# Patient Record
Sex: MALE | Race: WHITE
[De-identification: names, ages, dates, MRNs, and addresses within clinical notes are randomized per-mention and may not be internally consistent; named-entity substitution may affect disease eponyms.]

---

## 2018-05-30 ENCOUNTER — HOSPITAL ENCOUNTER (EMERGENCY)
Dept: HOSPITAL 76 - ED | Age: 38
Discharge: HOME | End: 2018-05-30
Payer: OTHER GOVERNMENT

## 2018-05-30 VITALS — SYSTOLIC BLOOD PRESSURE: 113 MMHG | DIASTOLIC BLOOD PRESSURE: 58 MMHG
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
## 2018-05-30 DIAGNOSIS — S09.90XA: Primary | ICD-10-CM

## 2018-05-30 DIAGNOSIS — S90.31XA: ICD-10-CM

## 2018-05-30 DIAGNOSIS — S23.9XXA: ICD-10-CM
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
## 2018-05-30 DIAGNOSIS — S13.9XXA: ICD-10-CM

## 2018-05-30 DIAGNOSIS — S70.11XA: ICD-10-CM
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
## 2018-05-30 PROCEDURE — 99283 EMERGENCY DEPT VISIT LOW MDM: CPT

## 2018-05-30 PROCEDURE — 70450 CT HEAD/BRAIN W/O DYE: CPT

## 2018-05-30 PROCEDURE — 36415 COLL VENOUS BLD VENIPUNCTURE: CPT

## 2018-05-30 PROCEDURE — 99284 EMERGENCY DEPT VISIT MOD MDM: CPT

## 2018-05-30 PROCEDURE — 73630 X-RAY EXAM OF FOOT: CPT

## 2018-05-30 PROCEDURE — 96374 THER/PROPH/DIAG INJ IV PUSH: CPT

## 2018-05-30 PROCEDURE — 72170 X-RAY EXAM OF PELVIS: CPT

## 2018-05-30 PROCEDURE — 72125 CT NECK SPINE W/O DYE: CPT

## 2018-05-30 PROCEDURE — 73552 X-RAY EXAM OF FEMUR 2/>: CPT

## 2018-05-30 PROCEDURE — 71046 X-RAY EXAM CHEST 2 VIEWS: CPT

## 2018-05-30 PROCEDURE — 96375 TX/PRO/DX INJ NEW DRUG ADDON: CPT

## 2018-05-30 NOTE — XRAY REPORT
EXAM:

RIGHT FEMUR RADIOGRAPHY

 

EXAM DATE: 5/30/2018 03:38 PM.

 

CLINICAL HISTORY: Pain after fall from horse today, subsequently stepped on by horse.

 

COMPARISON: None.

 

TECHNIQUE: 4 views.

 

FINDINGS: 

Bones: Normal. No fracture or bone lesion.

 

Joints: The visualized hip and knee joints are normal. No effusions.

 

Soft Tissues: Normal. No soft tissue swelling.

 

IMPRESSION: Normal femur radiography.

 

[facility]

Referring Provider Line: [phone]

 

SITE ID: [id number]

## 2018-05-30 NOTE — CT REPORT
EXAM:

CT CERVICAL SPINE WITHOUT CONTRAST

 

DATE: 5/30/2018 03:07 PM.

 

HISTORY: Fall off horse, C1-2 pain.

 

COMPARISONS: None.

 

TECHNIQUE: Thin-section axial images were acquired of the cervical spine without contrast. Post-proce
ssing: Coronal and sagittal reformats. Other: None.

 

In accordance with CT protocol optimization, one or more of the following dose reduction techniques w
ere utilized for this exam: automated exposure control, adjustment of mA and/or KV based on patient s
ize, or use of iterative reconstructive technique.

 

FINDINGS: 

Alignment: No scoliosis or spondylolisthesis.

 

Bones: No fracture or bone lesion from the craniocervical junction through T2-T3.

 

Interspace Levels/Facets: Mild anterior osteophyte formation at C5-C6. Facets are unremarkable. Mild 
disk height loss at C5-C6 and C6-C7. There is mild bilateral bony foraminal stenosis at C5-C6 related
 to uncovertebral joint hypertrophy.

 

Musculature: Normal. No fatty atrophy.

 

Other: The paravertebral and prevertebral soft tissues are unremarkable. The lung apices are clear.

 

IMPRESSION: 

1. No fracture or subluxation from the craniocervical junction through T2-T3. 

2. Mild degenerative disk disease C5-C6 and C6-C7. 

3. Bilateral bony foraminal stenosis at C6-C7.

 

[facility]

Referring Provider Line: [phone]

 

SITE ID: [id number]

## 2018-05-30 NOTE — XRAY PRELIMINARY REPORT
Accession: [id number]

Exam: XR CHEST 2 VIEW X-RAY

 

IMPRESSION: Normal 2-view chest radiography.

 

[facility]

 

SITE ID: [id number]

## 2018-05-30 NOTE — XRAY PRELIMINARY REPORT
Accession: [id number]

Exam: XR PELVIS 1 VIEW

 

IMPRESSION: Normal pelvis radiography.

 

[facility]

 

SITE ID: [id number]

## 2018-05-30 NOTE — ED PHYSICIAN DOCUMENTATION
History of Present Illness





- Stated complaint


Stated Complaint: FALL FROM HORSE





- Chief complaint


Chief Complaint: Trauma Ke





- Additonal information


Additional information: 





hx from pt


38 male


ridign a horse


no helmet


horse spooked 2/2 kite


pt fell off in semi standing position bu the then was hit in side of head and r 

shoulder by horses knee and knocked down face first and then horse stepped on 

his right leg


has HA neck pain upper back pian terri hip pain R femur and right foot pain


no blood thinners


no LOC


no numbness or weakness











Review of Systems


Ears: denies: Drainage/discharge


Nose: denies: Epistaxis


Cardiac: reports: Chest pain / pressure (posterior / upper back)


GI: denies: Abdominal Pain


Musculoskeletal: reports: Neck pain, Back pain, Extremity pain


Neurologic: denies: Focal weakness, Numbness


Endocrine: denies: Easy bruising / bleeding


Immunocompromised: denies: Immunocompromised





PD PAST MEDICAL HISTORY





- Present Medications


Home Medications: 


 Ambulatory Orders











 Medication  Instructions  Recorded  Confirmed


 


Celecoxib [CeleBREX] 100 mg PO PRN 05/30/18 


 


Cyclobenzaprine [Flexeril] 10 mg PO TID PRN #20 tablet 05/30/18 


 


Hydrocodone/Acetaminophen 1 each PO PRN 05/30/18 





[Hydrocodone-Acetamin 5-325 mg]   


 


Ibuprofen [Motrin] 400 mg PO Q6H PRN #30 tablet 05/30/18 


 


Lidocaine Patch 5% [Lidoderm Patch] 1 each TOP DAILY PRN #10 patch 05/30/18 


 


Muscle Relaxer  05/30/18 














- Allergies


Allergies/Adverse Reactions: 


 Allergies











Allergy/AdvReac Type Severity Reaction Status Date / Time


 


No Known Drug Allergies Allergy   Verified 05/30/18 14:50














PD ED PE NORMAL





- Vitals


Vital signs reviewed: Yes





- General


General: Alert and oriented X 3





- HEENT


HEENT: Atraumatic, PERRL





- Neck


Neck: No: No bony TTP (+ C1C2 TTP)





- Cardiac


Cardiac: RRR





- Respiratory


Respiratory: No respiratory distress, Clear bilaterally





- Abdomen


Abdomen: Soft, Non tender





- Back


Back: Other (+ TTP ipper to mid T spine)





- Derm


Derm: Normal color





- Extremities


Extremities: Other (TTP terri hip, early bruising to R thigh, TTP distal medial 

dorsal R foot, MSV intact)





- Neuro


Neuro: Alert and oriented X 3, CNs 2-12 intact, No motor deficit, No sensory 

deficit, Normal speech


Eye Opening: Spontaneous


Motor: Obeys Commands


Verbal: Oriented


GCS Score: 15





Results





- Vitals


Vitals: 


 Vital Signs - 24 hr











  05/30/18 05/30/18





  14:09 16:06


 


Temperature 37.2 C 


 


Heart Rate 69 63


 


Respiratory 16 18





Rate  


 


Blood Pressure 126/84 H 138/78 H


 


O2 Saturation 100 100








 Oxygen











O2 Source                      Room air

















- Rads (name of study)


  ** CTH


Radiology: See rad report (normal)





  ** CT CS


Radiology: See rad report (no fx or sublux, degen disk dz, foraminal stenosis 

terri)





  ** pelvis


Radiology: See rad report (normal)





  ** CXR


Radiology: See rad report (normal)





  ** femur


Radiology: See rad report (neg)





  ** foot


Radiology: See rad report (neg)





Departure





- Departure


Disposition: 01 Home, Self Care


Clinical Impression: 


Fall from horse


Qualifiers:


 Encounter type: initial encounter Qualified Code(s): V80.010A - Animal-rider 

injured by fall from or being thrown from horse in noncollision accident, 

initial encounter





Head injury


Qualifiers:


 Encounter type: initial encounter Qualified Code(s): S09.90XA - Unspecified 

injury of head, initial encounter





Acute neck sprain


Qualifiers:


 Encounter type: initial encounter Qualified Code(s): S13.9XXA - Sprain of 

joints and ligaments of unspecified parts of neck, initial encounter





Thoracic back sprain


Qualifiers:


 Encounter type: initial encounter Qualified Code(s): S23.9XXA - Sprain of 

unspecified parts of thorax, initial encounter





Thigh contusion


Qualifiers:


 Encounter type: initial encounter Laterality: right Qualified Code(s): 

S70.11XA - Contusion of right thigh, initial encounter





Foot contusion


Qualifiers:


 Encounter type: initial encounter Laterality: right Qualified Code(s): 

S90.31XA - Contusion of right foot, initial encounter





Condition: Good


Instructions:  ED Head Injury Closed, ED Neck Back Pain General


Prescriptions: 


Cyclobenzaprine [Flexeril] 10 mg PO TID PRN #20 tablet


 PRN Reason: Spasms


Ibuprofen [Motrin] 400 mg PO Q6H PRN #30 tablet


 PRN Reason: Pain


Lidocaine Patch 5% [Lidoderm Patch] 1 each TOP DAILY PRN #10 patch


 PRN Reason: Pain


Comments: 


Thankfully the xrays and CT scans are fine.


No broken bones or brain bleeding


You will be stiff and sore for several days but should be fine


Recommend motrin, flexeril (muscle relaxant) and lidocaine patches as needed 

for the pain


Follow up with your PMD if not better in a week.


Return to the ER sooner if worse

## 2018-05-30 NOTE — XRAY REPORT
EXAM:

PELVIS RADIOGRAPHY

 

EXAM DATE: 5/30/2018 03:37 PM.

 

CLINICAL HISTORY: Bilateral hip pain, right greater than left, after fall from horse.

 

COMPARISON: None.

 

TECHNIQUE: 1 view.

 

FINDINGS: 

Bones: Normal. No fracture or bone lesion.

 

Joints: The visualized hip, pubis symphysis, and sacroiliac joints are preserved. No subluxation.

 

Soft Tissues: Normal. No soft tissue swelling.

 

IMPRESSION: Normal pelvis radiography.

 

[facility]

Referring Provider Line: [phone]

 

SITE ID: [id number]

## 2018-05-30 NOTE — CT PRELIMINARY REPORT
Accession: [id number]

Exam: CT HEAD W/O

 

IMPRESSION: Normal head CT.

 

[facility]

 

SITE ID: [id number]

## 2018-05-30 NOTE — XRAY REPORT
EXAM:

RIGHT FOOT RADIOGRAPHY

 

EXAM DATE: 5/30/2018 03:39 PM.

 

CLINICAL HISTORY: Right foot pain after stepped on by horse today.

 

COMPARISON: None.

 

TECHNIQUE: 3 views.

 

FINDINGS: 

Bones: Normal. No fractures or bone lesions.

 

Joints: Normal. No subluxations.

 

Soft Tissues: Normal. No soft tissue swelling.

 

IMPRESSION: Normal foot radiography.

 

[facility]

Referring Provider Line: [phone]

 

SITE ID: [id number]

## 2018-05-30 NOTE — XRAY PRELIMINARY REPORT
Accession: [id number]

Exam: XR FOOT 3 VIEW RT

 

IMPRESSION: Normal foot radiography.

 

[facility]

 

SITE ID: [id number]

## 2018-05-30 NOTE — XRAY REPORT
EXAM:

CHEST RADIOGRAPHY

 

EXAM DATE: 5/30/2018 03:36 PM.

 

CLINICAL HISTORY: Upper back pain after fall from horse today.

 

COMPARISON: None.

 

TECHNIQUE: 2 views.

 

FINDINGS: 

Lungs/Pleura: No focal opacities evident. No pleural effusion. No pneumothorax. Normal volumes.

 

Mediastinum: Heart and mediastinal contours are unremarkable.

 

Other: None.

 

IMPRESSION: Normal 2-view chest radiography.

 

[facility]

Referring Provider Line: [phone]

 

SITE ID: 001

## 2018-05-30 NOTE — CT PRELIMINARY REPORT
Accession: [id number]

Exam: CT CERVICAL SPINE W/O

 

IMPRESSION: 

1. No fracture or subluxation from the craniocervical junction through T2-T3. 

2. Mild degenerative disk disease C5-C6 and C6-C7. 

3. Bilateral bony foraminal stenosis at C6-C7.

 

[facility]

 

SITE ID: [id number]

## 2018-05-30 NOTE — CT REPORT
EXAM:

CT HEAD

 

EXAM DATE: 5/30/2018 03:07 PM.

 

CLINICAL HISTORY: Fall off horse. Head injury.

 

COMPARISON: CT cervical spine performed concurrently.

 

TECHNIQUE: Multiaxial CT images were obtained from the foramen magnum to the vertex. Reformats: Coron
al. IV contrast: None.

 

In accordance with CT protocol optimization, one or more of the following dose reduction techniques w
ere utilized for this exam: automated exposure control, adjustment of mA and/or KV based on patient s
ize, or use of iterative reconstructive technique.

 

FINDINGS:

Parenchyma: No intraparenchymal hemorrhage. No evidence of mass, midline shift, or CT findings of inf
arction. Gray-white differentiation is distinct. 

 

Extraaxial Spaces: Normal for age. No subdural or epidural collections identified.

 

Ventricles: Normal in size and position.

 

Sinuses and Orbits: Mild inferior bilateral maxillary sinus mucosal thickening. Otherwise, appear romario
al sinuses are clear as are mastoids and middle ears.

 

Bones: No evidence of fracture or calvarial defect.

 

Other: No scalp contusion is identified.

 

IMPRESSION: Normal head CT.

 

[facility]

Referring Provider Line: [phone]

 

SITE ID: [id number]

## 2018-05-30 NOTE — XRAY PRELIMINARY REPORT
Accession: [id number]

Exam: XR FEMUR 2V RT

 

IMPRESSION: Normal femur radiography.

 

[facility]

 

SITE ID: [id number]